# Patient Record
Sex: MALE | Race: ASIAN | NOT HISPANIC OR LATINO | Employment: STUDENT | URBAN - METROPOLITAN AREA
[De-identification: names, ages, dates, MRNs, and addresses within clinical notes are randomized per-mention and may not be internally consistent; named-entity substitution may affect disease eponyms.]

---
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
## 2024-04-29 ENCOUNTER — OFFICE VISIT (OUTPATIENT)
Dept: FAMILY MEDICINE | Facility: CLINIC | Age: 19
End: 2024-04-29
Payer: COMMERCIAL

## 2024-04-29 VITALS
HEIGHT: 62 IN | HEART RATE: 73 BPM | RESPIRATION RATE: 16 BRPM | SYSTOLIC BLOOD PRESSURE: 122 MMHG | DIASTOLIC BLOOD PRESSURE: 87 MMHG | OXYGEN SATURATION: 98 % | WEIGHT: 152.04 LBS | TEMPERATURE: 97.5 F | BODY MASS INDEX: 27.98 KG/M2

## 2024-04-29 DIAGNOSIS — E78.2 MIXED HYPERLIPIDEMIA: ICD-10-CM

## 2024-04-29 DIAGNOSIS — Z00.00 ROUTINE GENERAL MEDICAL EXAMINATION AT A HEALTH CARE FACILITY: Primary | ICD-10-CM

## 2024-04-29 PROCEDURE — 99395 PREV VISIT EST AGE 18-39: CPT | Performed by: FAMILY MEDICINE

## 2024-04-29 SDOH — HEALTH STABILITY: PHYSICAL HEALTH: ON AVERAGE, HOW MANY DAYS PER WEEK DO YOU ENGAGE IN MODERATE TO STRENUOUS EXERCISE (LIKE A BRISK WALK)?: 1 DAY

## 2024-04-29 ASSESSMENT — SOCIAL DETERMINANTS OF HEALTH (SDOH): HOW OFTEN DO YOU GET TOGETHER WITH FRIENDS OR RELATIVES?: ONCE A WEEK

## 2024-04-29 NOTE — PROGRESS NOTES
"Preventive Care Visit  [facility] YESIKAGERARDO De Paz MD, Family Medicine  Apr 29, 2024      Assessment & Plan     Routine general medical examination at a health care facility      Mixed hyperlipidemia    Decrease intake of beef and chicken skin.                BMI  Estimated body mass index is 28.03 kg/m  as calculated from the following:    Height as of this encounter: 1.568 m (5' 1.75\").    Weight as of this encounter: 69 kg (152 lb 0.6 oz).       Counseling  Appropriate preventive services were discussed with this patient, including applicable screening as appropriate for fall prevention, nutrition, physical activity, Tobacco-use cessation, weight loss and cognition.  Checklist reviewing preventive services available has been given to the patient.          William Rock is a 18 year old, presenting for the following:  Physical        4/29/2024     2:42 PM   Additional Questions   Roomed by Rosy VORA MA   Accompanied by SELF            HPI  He works at the [employer]    Walks the dogs 3 times per week.    Occasional right low back pain when lifting.    LDL was 156, HDL 29.              4/29/2024   General Health   How would you rate your overall physical health? (!) FAIR   Feel stress (tense, anxious, or unable to sleep) Only a little   (!) STRESS CONCERN      4/29/2024   Nutrition   Three or more servings of calcium each day? Yes   Diet: I don't know   How many servings of fruit and vegetables per day? (!) I DON'T KNOW   How many sweetened beverages each day? 0-1         4/29/2024   Exercise   Days per week of moderate/strenous exercise 1 day   (!) EXERCISE CONCERN      4/29/2024   Social Factors   Frequency of gathering with friends or relatives Once a week   Worry food won't last until get money to buy more No   Food not last or not have enough money for food? No   Do you have housing?  Yes   Are you worried about losing your housing? No   Lack of transportation? No   Unable to get utilities " "(heat,electricity)? No         4/29/2024   Dental   Dentist two times every year? (!) NO         4/29/2024   TB Screening   Were you born outside of the [place]? Yes         Today's PHQ-2 Score:       4/29/2024     2:36 PM   PHQ-2 ( 1999 Pfizer)   Q1: Little interest or pleasure in doing things 2   Q2: Feeling down, depressed or hopeless 0   PHQ-2 Score 2   Q1: Little interest or pleasure in doing things More than half the days   Q2: Feeling down, depressed or hopeless Not at all   PHQ-2 Score 2           4/29/2024   Substance Use   Alcohol more than 3/day or more than 7/wk Not Applicable   Do you use any other substances recreationally? No     Social History     Tobacco Use    Smoking status: Never     Passive exposure: Never    Smokeless tobacco: Never   Vaping Use    Vaping status: Never Used   Substance Use Topics    Alcohol use: Never    Drug use: Never           4/29/2024   STI Screening   New sexual partner(s) since last STI/HIV test? No         4/29/2024   Contraception/Family Planning   Questions about contraception or family planning No        Reviewed and updated as needed this visit by Provider                      Current Outpatient Medications   Medication Sig Dispense Refill    loratadine (CLARITIN) 10 mg tablet [LORATADINE (CLARITIN) 10 MG TABLET] Take 1 tablet (10 mg total) by mouth daily. (Patient not taking: Reported on 11/5/2021) 30 tablet 11    white petrolatum (WHITE PETROLEUM JELLY) ointment [WHITE PETROLATUM (WHITE PETROLEUM JELLY) OINTMENT] Apply topically as needed for dry skin. (Patient not taking: Reported on 11/5/2021) 30 g 2            Objective    Exam  /87   Pulse 73   Temp 97.5  F (36.4  C) (Temporal)   Resp 16   Ht 1.568 m (5' 1.75\")   Wt 69 kg (152 lb 0.6 oz)   SpO2 98%   BMI 28.03 kg/m     Estimated body mass index is 28.03 kg/m  as calculated from the following:    Height as of this encounter: 1.568 m (5' 1.75\").    Weight as of this encounter: 69 kg (152 lb 0.6 " oz).    Physical Exam    Eyes: EOM full, pupils normal, conjunctivae normal  Ears: TM's and canals normal  Oropharynx: normal  Neck: supple without adenopathy or thyromegaly  Lungs: normal  Heart: regular rhythm, normal rate, no murmur  Abdomen: no HSM, mass or tenderness  Extremities: FROM, normal strength and sensation    : Exam declined by parent/patient. Reason for decline: Patient/Parental preference        Signed Electronically by: Darryn De Paz MD

## 2024-04-29 NOTE — PATIENT INSTRUCTIONS
Your LDL (bad) cholesterol is elevated.  It can improve with decreased intake of high-cholesterol foods, such as beef, angelo, sausage, the fat around pork, eggs, cheese, butter, ice cream, shellfish (shrimp, crab or lobster) and chicken skin.